# Patient Record
Sex: FEMALE | Race: BLACK OR AFRICAN AMERICAN | NOT HISPANIC OR LATINO | URBAN - METROPOLITAN AREA
[De-identification: names, ages, dates, MRNs, and addresses within clinical notes are randomized per-mention and may not be internally consistent; named-entity substitution may affect disease eponyms.]

---

## 2019-05-30 ENCOUNTER — EMERGENCY (EMERGENCY)
Facility: HOSPITAL | Age: 39
LOS: 1 days | Discharge: HOME | End: 2019-05-30
Admitting: EMERGENCY MEDICINE
Payer: MEDICAID

## 2019-05-30 VITALS
TEMPERATURE: 98 F | DIASTOLIC BLOOD PRESSURE: 109 MMHG | RESPIRATION RATE: 18 BRPM | SYSTOLIC BLOOD PRESSURE: 145 MMHG | OXYGEN SATURATION: 99 % | HEART RATE: 83 BPM

## 2019-05-30 VITALS
OXYGEN SATURATION: 100 % | DIASTOLIC BLOOD PRESSURE: 97 MMHG | SYSTOLIC BLOOD PRESSURE: 148 MMHG | HEART RATE: 64 BPM | RESPIRATION RATE: 18 BRPM

## 2019-05-30 PROCEDURE — 99283 EMERGENCY DEPT VISIT LOW MDM: CPT

## 2019-05-30 PROCEDURE — 73630 X-RAY EXAM OF FOOT: CPT | Mod: 26,50

## 2019-05-30 RX ORDER — ACETAMINOPHEN 500 MG
650 TABLET ORAL ONCE
Refills: 0 | Status: COMPLETED | OUTPATIENT
Start: 2019-05-30 | End: 2019-05-30

## 2019-05-30 RX ORDER — IBUPROFEN 200 MG
600 TABLET ORAL ONCE
Refills: 0 | Status: COMPLETED | OUTPATIENT
Start: 2019-05-30 | End: 2019-05-30

## 2019-05-30 RX ADMIN — Medication 600 MILLIGRAM(S): at 13:32

## 2019-05-30 RX ADMIN — Medication 650 MILLIGRAM(S): at 13:32

## 2019-05-30 NOTE — ED PROVIDER NOTE - OBJECTIVE STATEMENT
38 y/o female with PMHx  presents to the ED c/o 40 y/o female with PMHx  presents to the ED c/o pressure pain on her upper feet BL that she feels when walking since work injury 8 days ago. 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 Pt went to PMD 9 days ago where she was told to go to the ER since it is a work injury. Pt denotes blisters that have resolve and her L fourth toenail is "about to fall off." Pt has taken Motrin yesterday. No other acute complaints at time of eval. 38 y/o female with PMHx  presents to the ED c/o pressure pain on her upper feet BL that she feels when walking since work injury 8 days ago. 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 Pt went to PMD 9 days ago where she was told to go to the ER since it is a work injury. Pt denotes blisters that have resolve and her L fourth toenail is "about to fall off." Pt has taken Motrin yesterday. No other acute complaints at time of eval.

## 2019-05-30 NOTE — ED PROVIDER NOTE - CLINICAL SUMMARY MEDICAL DECISION MAKING FREE TEXT BOX
38 y/o female with PMHx  presents to the ED c/o pressure pain on her upper feet BL that she feels when walking since work injury 8 days ago.

## 2019-05-30 NOTE — ED ADULT TRIAGE NOTE - CHIEF COMPLAINT QUOTE
Patient reports work incident, where boiling water was splashed onto her feet on Thursday 05/23/2019. Patient currently has no open skin, no blister seen on assessment. Patient reports blister healed and now has bilateral residual foot pain.

## 2019-05-30 NOTE — ED PROVIDER NOTE - SKIN, MLM
Skin normal color for race, warm, dry and intact. No evidence of rash. Skin normal color for race, warm, dry and intact. No evidence of rash. No blisters/crepitous/redness/edema appreciated.

## 2021-10-03 ENCOUNTER — EMERGENCY (EMERGENCY)
Facility: HOSPITAL | Age: 41
LOS: 1 days | Discharge: HOME | End: 2021-10-03
Attending: EMERGENCY MEDICINE
Payer: MEDICAID

## 2021-10-03 VITALS
RESPIRATION RATE: 22 BRPM | SYSTOLIC BLOOD PRESSURE: 178 MMHG | TEMPERATURE: 98 F | HEART RATE: 88 BPM | HEIGHT: 69 IN | OXYGEN SATURATION: 99 % | WEIGHT: 220.02 LBS | DIASTOLIC BLOOD PRESSURE: 137 MMHG

## 2021-10-03 VITALS
RESPIRATION RATE: 18 BRPM | SYSTOLIC BLOOD PRESSURE: 160 MMHG | DIASTOLIC BLOOD PRESSURE: 100 MMHG | HEART RATE: 74 BPM | OXYGEN SATURATION: 98 %

## 2021-10-03 LAB
ALBUMIN SERPL ELPH-MCNC: 4.5 G/DL — SIGNIFICANT CHANGE UP (ref 3.3–5)
ALP SERPL-CCNC: 92 U/L — SIGNIFICANT CHANGE UP (ref 40–120)
ALT FLD-CCNC: 7 U/L — LOW (ref 10–45)
ANION GAP SERPL CALC-SCNC: 13 MMOL/L — SIGNIFICANT CHANGE UP (ref 5–17)
APTT BLD: 28.3 SEC — SIGNIFICANT CHANGE UP (ref 27.5–35.5)
AST SERPL-CCNC: 10 U/L — SIGNIFICANT CHANGE UP (ref 10–40)
BASE EXCESS BLDV CALC-SCNC: 1.8 MMOL/L — SIGNIFICANT CHANGE UP (ref -2–2)
BASOPHILS # BLD AUTO: 0.04 K/UL — SIGNIFICANT CHANGE UP (ref 0–0.2)
BASOPHILS NFR BLD AUTO: 0.5 % — SIGNIFICANT CHANGE UP (ref 0–2)
BILIRUB SERPL-MCNC: 0.2 MG/DL — SIGNIFICANT CHANGE UP (ref 0.2–1.2)
BUN SERPL-MCNC: 9 MG/DL — SIGNIFICANT CHANGE UP (ref 7–23)
CA-I SERPL-SCNC: 1.19 MMOL/L — SIGNIFICANT CHANGE UP (ref 1.15–1.33)
CALCIUM SERPL-MCNC: 9.2 MG/DL — SIGNIFICANT CHANGE UP (ref 8.4–10.5)
CHLORIDE BLDV-SCNC: 103 MMOL/L — SIGNIFICANT CHANGE UP (ref 96–108)
CHLORIDE SERPL-SCNC: 102 MMOL/L — SIGNIFICANT CHANGE UP (ref 96–108)
CO2 BLDV-SCNC: 29 MMOL/L — HIGH (ref 22–26)
CO2 SERPL-SCNC: 24 MMOL/L — SIGNIFICANT CHANGE UP (ref 22–31)
CREAT SERPL-MCNC: 0.82 MG/DL — SIGNIFICANT CHANGE UP (ref 0.5–1.3)
EOSINOPHIL # BLD AUTO: 0.17 K/UL — SIGNIFICANT CHANGE UP (ref 0–0.5)
EOSINOPHIL NFR BLD AUTO: 2.2 % — SIGNIFICANT CHANGE UP (ref 0–6)
GAS PNL BLDV: 139 MMOL/L — SIGNIFICANT CHANGE UP (ref 136–145)
GAS PNL BLDV: SIGNIFICANT CHANGE UP
GLUCOSE BLDV-MCNC: 88 MG/DL — SIGNIFICANT CHANGE UP (ref 70–99)
GLUCOSE SERPL-MCNC: 88 MG/DL — SIGNIFICANT CHANGE UP (ref 70–99)
HCG SERPL-ACNC: <2 MIU/ML — SIGNIFICANT CHANGE UP
HCO3 BLDV-SCNC: 27 MMOL/L — SIGNIFICANT CHANGE UP (ref 22–29)
HCT VFR BLD CALC: 35.9 % — SIGNIFICANT CHANGE UP (ref 34.5–45)
HCT VFR BLDA CALC: 37 % — SIGNIFICANT CHANGE UP (ref 34.5–46.5)
HGB BLD CALC-MCNC: 12.2 G/DL — SIGNIFICANT CHANGE UP (ref 11.7–16.1)
HGB BLD-MCNC: 11.3 G/DL — LOW (ref 11.5–15.5)
IMM GRANULOCYTES NFR BLD AUTO: 0.3 % — SIGNIFICANT CHANGE UP (ref 0–1.5)
INR BLD: 1.02 RATIO — SIGNIFICANT CHANGE UP (ref 0.88–1.16)
LACTATE BLDV-MCNC: 0.8 MMOL/L — SIGNIFICANT CHANGE UP (ref 0.7–2)
LYMPHOCYTES # BLD AUTO: 4.33 K/UL — HIGH (ref 1–3.3)
LYMPHOCYTES # BLD AUTO: 55.4 % — HIGH (ref 13–44)
MAGNESIUM SERPL-MCNC: 1.9 MG/DL — SIGNIFICANT CHANGE UP (ref 1.6–2.6)
MCHC RBC-ENTMCNC: 27.9 PG — SIGNIFICANT CHANGE UP (ref 27–34)
MCHC RBC-ENTMCNC: 31.5 GM/DL — LOW (ref 32–36)
MCV RBC AUTO: 88.6 FL — SIGNIFICANT CHANGE UP (ref 80–100)
MONOCYTES # BLD AUTO: 0.56 K/UL — SIGNIFICANT CHANGE UP (ref 0–0.9)
MONOCYTES NFR BLD AUTO: 7.2 % — SIGNIFICANT CHANGE UP (ref 2–14)
NEUTROPHILS # BLD AUTO: 2.7 K/UL — SIGNIFICANT CHANGE UP (ref 1.8–7.4)
NEUTROPHILS NFR BLD AUTO: 34.4 % — LOW (ref 43–77)
NRBC # BLD: 0 /100 WBCS — SIGNIFICANT CHANGE UP (ref 0–0)
NT-PROBNP SERPL-SCNC: 253 PG/ML — SIGNIFICANT CHANGE UP (ref 0–300)
PCO2 BLDV: 45 MMHG — HIGH (ref 39–42)
PH BLDV: 7.39 — SIGNIFICANT CHANGE UP (ref 7.32–7.43)
PLATELET # BLD AUTO: 281 K/UL — SIGNIFICANT CHANGE UP (ref 150–400)
PO2 BLDV: 37 MMHG — SIGNIFICANT CHANGE UP (ref 25–45)
POTASSIUM BLDV-SCNC: 2.9 MMOL/L — CRITICAL LOW (ref 3.5–5.1)
POTASSIUM SERPL-MCNC: 3 MMOL/L — LOW (ref 3.5–5.3)
POTASSIUM SERPL-SCNC: 3 MMOL/L — LOW (ref 3.5–5.3)
PROT SERPL-MCNC: 7.5 G/DL — SIGNIFICANT CHANGE UP (ref 6–8.3)
PROTHROM AB SERPL-ACNC: 12.2 SEC — SIGNIFICANT CHANGE UP (ref 10.6–13.6)
RBC # BLD: 4.05 M/UL — SIGNIFICANT CHANGE UP (ref 3.8–5.2)
RBC # FLD: 14.6 % — HIGH (ref 10.3–14.5)
SAO2 % BLDV: 63.5 % — LOW (ref 67–88)
SARS-COV-2 RNA SPEC QL NAA+PROBE: SIGNIFICANT CHANGE UP
SODIUM SERPL-SCNC: 139 MMOL/L — SIGNIFICANT CHANGE UP (ref 135–145)
TROPONIN T, HIGH SENSITIVITY RESULT: 7 NG/L — SIGNIFICANT CHANGE UP (ref 0–51)
TROPONIN T, HIGH SENSITIVITY RESULT: 8 NG/L — SIGNIFICANT CHANGE UP (ref 0–51)
WBC # BLD: 7.82 K/UL — SIGNIFICANT CHANGE UP (ref 3.8–10.5)
WBC # FLD AUTO: 7.82 K/UL — SIGNIFICANT CHANGE UP (ref 3.8–10.5)

## 2021-10-03 PROCEDURE — 74174 CTA ABD&PLVS W/CONTRAST: CPT | Mod: MA

## 2021-10-03 PROCEDURE — 84132 ASSAY OF SERUM POTASSIUM: CPT

## 2021-10-03 PROCEDURE — 71275 CT ANGIOGRAPHY CHEST: CPT | Mod: 26,MA

## 2021-10-03 PROCEDURE — 99291 CRITICAL CARE FIRST HOUR: CPT

## 2021-10-03 PROCEDURE — 82803 BLOOD GASES ANY COMBINATION: CPT

## 2021-10-03 PROCEDURE — 82947 ASSAY GLUCOSE BLOOD QUANT: CPT

## 2021-10-03 PROCEDURE — 93010 ELECTROCARDIOGRAM REPORT: CPT

## 2021-10-03 PROCEDURE — 74174 CTA ABD&PLVS W/CONTRAST: CPT | Mod: 26,MA

## 2021-10-03 PROCEDURE — 83605 ASSAY OF LACTIC ACID: CPT

## 2021-10-03 PROCEDURE — 82435 ASSAY OF BLOOD CHLORIDE: CPT

## 2021-10-03 PROCEDURE — 82330 ASSAY OF CALCIUM: CPT

## 2021-10-03 PROCEDURE — 71275 CT ANGIOGRAPHY CHEST: CPT | Mod: MA

## 2021-10-03 PROCEDURE — 84295 ASSAY OF SERUM SODIUM: CPT

## 2021-10-03 PROCEDURE — 36415 COLL VENOUS BLD VENIPUNCTURE: CPT

## 2021-10-03 PROCEDURE — 85014 HEMATOCRIT: CPT

## 2021-10-03 PROCEDURE — 85018 HEMOGLOBIN: CPT

## 2021-10-03 PROCEDURE — 85025 COMPLETE CBC W/AUTO DIFF WBC: CPT

## 2021-10-03 PROCEDURE — 99291 CRITICAL CARE FIRST HOUR: CPT | Mod: 25

## 2021-10-03 PROCEDURE — 83880 ASSAY OF NATRIURETIC PEPTIDE: CPT

## 2021-10-03 PROCEDURE — 83735 ASSAY OF MAGNESIUM: CPT

## 2021-10-03 PROCEDURE — 93005 ELECTROCARDIOGRAM TRACING: CPT

## 2021-10-03 PROCEDURE — 84484 ASSAY OF TROPONIN QUANT: CPT

## 2021-10-03 PROCEDURE — 85610 PROTHROMBIN TIME: CPT

## 2021-10-03 PROCEDURE — 71045 X-RAY EXAM CHEST 1 VIEW: CPT | Mod: 26

## 2021-10-03 PROCEDURE — 71045 X-RAY EXAM CHEST 1 VIEW: CPT

## 2021-10-03 PROCEDURE — 96374 THER/PROPH/DIAG INJ IV PUSH: CPT | Mod: XU

## 2021-10-03 PROCEDURE — U0003: CPT

## 2021-10-03 PROCEDURE — 80053 COMPREHEN METABOLIC PANEL: CPT

## 2021-10-03 PROCEDURE — U0005: CPT

## 2021-10-03 PROCEDURE — 84702 CHORIONIC GONADOTROPIN TEST: CPT

## 2021-10-03 PROCEDURE — 85730 THROMBOPLASTIN TIME PARTIAL: CPT

## 2021-10-03 RX ORDER — MORPHINE SULFATE 50 MG/1
4 CAPSULE, EXTENDED RELEASE ORAL ONCE
Refills: 0 | Status: DISCONTINUED | OUTPATIENT
Start: 2021-10-03 | End: 2021-10-03

## 2021-10-03 RX ORDER — LABETALOL HCL 100 MG
1 TABLET ORAL
Qty: 60 | Refills: 0
Start: 2021-10-03 | End: 2021-11-01

## 2021-10-03 RX ORDER — POTASSIUM CHLORIDE 20 MEQ
40 PACKET (EA) ORAL ONCE
Refills: 0 | Status: COMPLETED | OUTPATIENT
Start: 2021-10-03 | End: 2021-10-03

## 2021-10-03 RX ADMIN — MORPHINE SULFATE 4 MILLIGRAM(S): 50 CAPSULE, EXTENDED RELEASE ORAL at 15:02

## 2021-10-03 RX ADMIN — Medication 40 MILLIEQUIVALENT(S): at 16:58

## 2021-10-03 NOTE — ED ADULT NURSE NOTE - CHIEF COMPLAINT QUOTE
worsening sharp midsternal chest pain radiating across to L side and through to back since yesterday, worsened by exertion. also intermittent SOB  hx HTN

## 2021-10-03 NOTE — ED PROVIDER NOTE - CLINICAL SUMMARY MEDICAL DECISION MAKING FREE TEXT BOX
42y/o F w/ h/o HTN (no meds) p/w 1d chest pain radiates towards back, hypertensive but well appearing. DDx ACS, dissection, less likely PE. plan CTA, cardiac labs, EKG, CXR and pain control morphine.

## 2021-10-03 NOTE — ED ADULT NURSE NOTE - OBJECTIVE STATEMENT
41 y.o. female coming in from home for chest pain that started this morning. pt states that she has been feeling chest discomfort x 1 month since her son passed away but today she had increased chest pain. pt states that the chest pain is localized to the left side with no radiation but endorses having SOB that is worsened with lying flat and dizziness with standing. PMH of HTN that pt states she does not take medications for. A&Ox3, hypertensive (216/147), lung sounds clear bilaterally, no abdominal tenderness, bowel sounds present, no lower extremity edema. bed in lowest position, call bell in reach, boyfriend at bedside

## 2021-10-03 NOTE — ED PROVIDER NOTE - NS ED ROS FT
GENERAL: No fever, no chills  EYES: no change in vision  HEENT: no trouble swallowing, no trouble speaking  CARDIAC: +chest pain, no palpitations  PULMONARY: no cough, +SOB  GI: no abdominal pain, no nausea, no vomiting, no diarrhea, no constipation  : no dysuria, no frequency, no change in appearance, no odor of urine  SKIN: no rashes  NEURO: no headache, no weakness  MSK: no joint pain

## 2021-10-03 NOTE — ED PROVIDER NOTE - ATTENDING CONTRIBUTION TO CARE
41yr F hx of htn not on meds currently, marijuana smoker daily p/w chest pain since yesterday, 2 episodes yesterday and one PTA. reports sharp pain, constant no alleviating or exacerbating findings, has SOB with It but no DANIEL, no pleuritic nature of pain. never had this pain before. feels dizzy, pain 8/10. denies cough, sorethroat, recent infectious sx. denies abd pain, nausea vomiting. has peripheral edema  exam notable for well appearing, in mild pain. neuro intact grossly, bilat pulses radial equal, S1-2, clear lungs, Soft abd, some reproducibility of chest pain in palpation. soft non tender abd. pitting edema symmteric. in lower. well perfused.  concern for ACS vs hypertensive urgency, vs dissection. low concern for PE.   discussed with patient and planned for emergent CTA of chest to rule out dissection. medicate for pain. will reeval after imaging. if pain resolved and ACS work up unremarkable. will dc w follow up with primary.

## 2021-10-03 NOTE — ED ADULT TRIAGE NOTE - CHIEF COMPLAINT QUOTE
worsening sharp midsternal chest pain radiating across to L side since yesterday and through to back, worsened by exertion. also intermittent SOB  hx HTN worsening sharp midsternal chest pain radiating across to L side and through to back since yesterday, worsened by exertion. also intermittent SOB  hx HTN

## 2021-10-03 NOTE — ED PROVIDER NOTE - PROGRESS NOTE DETAILS
Rafi, PGY3: pt has no active chest pain. reviewed CT and multiple incidental findings. discussed pt option to start work up here but requesting to go home. VSS. Time was taken to answer all of patients questions and concerns. Return precaution instructions were given and patient understands and feels comfortable with disposition.

## 2021-10-03 NOTE — ED PROVIDER NOTE - OBJECTIVE STATEMENT
40y/o F w/ h/o HTN (no meds) p/w 1 mo. chest discomfort after son , but last 1d has been having intermittent chest pain lasting longer which radiates towards back, never had this before. Endorses SOB, worse when laying flat. Denies fevers, chills, nausea, vomiting, palpitations, cough, abdominal pain, back pain, dysuria, numbness, tingling, recent surgeries, travel history, calf pain, FHx heart disease.

## 2021-10-03 NOTE — ED PROVIDER NOTE - PATIENT PORTAL LINK FT
You can access the FollowMyHealth Patient Portal offered by [facility] by registering at the following website: [email or web address]. By joining [facility]’s FollowMyHealth portal, you will also be able to view your health information using other applications (apps) compatible with our system.

## 2021-10-03 NOTE — ED PROVIDER NOTE - NSFOLLOWUPCLINICS_GEN_ALL_ED_FT
[facility] Gynecology and Obstetrics  Gynceology/OB  [address] [place] [address]  Phone: [phone]  Fax:   Follow Up Time: Routine     [facility] Gynecology and Obstetrics  Gynceology/OB  [address] [place] [address]  Phone: [phone]  Fax:   Follow Up Time: Routine    [facility] [place]  Internal Medicine  [address] [place] [address]  Phone: [phone]  Fax: [phone]  Follow Up Time: Routine

## 2021-10-03 NOTE — ED PROVIDER NOTE - NSFOLLOWUPINSTRUCTIONS_ED_ALL_ED_FT
Chest Pain    Chest pain can be caused by many different conditions which may or may not be dangerous. Causes include heartburn, lung infections, heart attack, blood clot in lungs, skin infections, strain or damage to muscle, cartilage, or bones, etc. In addition to a history and physical examination, an electrocardiogram (ECG) or other lab tests may have been performed to determine the cause of your chest pain. Follow up with your primary care provider or with a cardiologist as instructed.     SEEK IMMEDIATE MEDICAL CARE IF YOU HAVE ANY OF THE FOLLOWING SYMPTOMS: worsening chest pain, coughing up blood, unexplained back/neck/jaw pain, severe abdominal pain, dizziness or lightheadedness, fainting, shortness of breath, sweaty or clammy skin, vomiting, or racing heart beat. These symptoms may represent a serious problem that is an emergency. Do not wait to see if the symptoms will go away. Get medical help right away. Call 911 and do not drive yourself to the hospital.     -Please follow up with your Primary Care Doctor within 24-48 hours and bring your paperwork     Please follow up on incidental findings:  -Multiple tiny subpleural nodules: CT follow-up may be obtained in approximately 1 month to evaluate for resolution.  -Recommend nonemergent pelvic sonography for 2 cm hypodensity right ovary.  -Prominent thyroid gland: obtain thyroid function tests

## 2021-10-03 NOTE — ED ADULT NURSE NOTE - HISTORY OF COVID-19 VACCINATION
----- Message from Indira Gayle sent at 11/28/2018 12:13 PM CST -----  Contact: Self  Pt is calling because she says she was referred by her PCP (even though there is not a referral in Epic) and would like to speak with Staff regarding being scheduled in Interventional Pain Management.      She can be reached at [phone].    Thank you.   No

## 2021-10-04 PROBLEM — I10 ESSENTIAL (PRIMARY) HYPERTENSION: Chronic | Status: ACTIVE | Noted: 2019-05-30

## 2022-07-04 ENCOUNTER — EMERGENCY (EMERGENCY)
Facility: HOSPITAL | Age: 42
LOS: 1 days | Discharge: HOME | End: 2022-07-04
Attending: EMERGENCY MEDICINE | Admitting: EMERGENCY MEDICINE

## 2022-07-04 VITALS
RESPIRATION RATE: 18 BRPM | HEIGHT: 69 IN | SYSTOLIC BLOOD PRESSURE: 140 MMHG | OXYGEN SATURATION: 99 % | TEMPERATURE: 99 F | HEART RATE: 98 BPM | DIASTOLIC BLOOD PRESSURE: 89 MMHG

## 2022-07-04 VITALS
RESPIRATION RATE: 20 BRPM | DIASTOLIC BLOOD PRESSURE: 122 MMHG | HEART RATE: 83 BPM | TEMPERATURE: 98 F | OXYGEN SATURATION: 100 % | SYSTOLIC BLOOD PRESSURE: 171 MMHG

## 2022-07-04 LAB — SARS-COV-2 RNA SPEC QL NAA+PROBE: DETECTED

## 2022-07-04 PROCEDURE — 99284 EMERGENCY DEPT VISIT MOD MDM: CPT

## 2022-07-04 PROCEDURE — 71045 X-RAY EXAM CHEST 1 VIEW: CPT | Mod: 26

## 2022-07-04 RX ORDER — ACETAMINOPHEN 500 MG
975 TABLET ORAL ONCE
Refills: 0 | Status: COMPLETED | OUTPATIENT
Start: 2022-07-04 | End: 2022-07-04

## 2022-07-04 RX ORDER — GUAIFENESIN/DEXTROMETHORPHAN 600MG-30MG
5 TABLET, EXTENDED RELEASE 12 HR ORAL ONCE
Refills: 0 | Status: COMPLETED | OUTPATIENT
Start: 2022-07-04 | End: 2022-07-04

## 2022-07-04 RX ADMIN — Medication 5 MILLILITER(S): at 12:04

## 2022-07-04 RX ADMIN — Medication 975 MILLIGRAM(S): at 12:03

## 2022-07-04 NOTE — ED PROVIDER NOTE - HIV OFFER
Will discuss following results with patient:    Echo 3/22/2019  Left ventricular hypertrophy. Normal LV wall motion and LV systolic function, LVEF 61%  Grade II/IV diastolic dysfunction, moderately elevated filling pressures.  Normal right ventricular size and systolic function. Mild pulmonary hypertension, RVSP 39.5 mmHg.  No significant valvular abnormalities  Mildly dilated sinus of Valsalva = 4.0 cm  Small pericardial effusion.  No significant change since the prior study.   Previously Declined (within the last year)

## 2022-07-04 NOTE — ED PROVIDER NOTE - PATIENT PORTAL LINK FT
You can access the FollowMyHealth Patient Portal offered by [facility] by registering at the following website: [email or web address]. By joining [facility]’s FollowMyHealth portal, you will also be able to view your health information using other applications (apps) compatible with our system.

## 2022-07-04 NOTE — ED PROVIDER NOTE - NSFOLLOWUPINSTRUCTIONS_ED_ALL_ED_FT
Upper Respiratory Infection, Adult  An upper respiratory infection (URI) is a common viral infection of the nose, throat, and upper air passages that lead to the lungs. The most common type of URI is the common cold. URIs usually get better on their own, without medical treatment.    What are the causes?  A URI is caused by a virus. You may catch a virus by: Breathing in droplets from an infected person's cough or sneeze. Touching something that has been exposed to the virus (contaminated) and then touching your mouth, nose, or eyes.    What increases the risk? You are more likely to get a URI if:  You are very young or very old.It is abram or winter. You have close contact with others, such as at a , school, or health care facility. You smoke. You have long-term (chronic) heart or lung disease. You have a weakened disease-fighting (immune) system. You have nasal allergies or asthma. You are experiencing a lot of stress. You work in an area that has poor air circulation. You have poor nutrition.    What are the signs or symptoms? A URI usually involves some of the following symptoms:  Runny or stuffy (congested) nose. Sneezing. Cough. Sore throat. Headache. Fatigue. Fever. Loss of appetite. Pain in your forehead, behind your eyes, and over your cheekbones (sinus pain). Muscle aches. Redness or irritation of the eyes. Pressure in the ears or face.    How is this diagnosed?  This condition may be diagnosed based on your medical history and symptoms, and a physical exam. Your health care provider may use a cotton swab to take a mucus sample from your nose (nasal swab). This sample can be tested to determine what virus is causing the illness.    How is this treated?  URIs usually get better on their own within 7–10 days. You can take steps at home to relieve your symptoms. Medicines cannot cure URIs, but your health care provider may recommend certain medicines to help relieve symptoms, such as: Over-the-counter cold medicines. Cough suppressants. Coughing is a type of defense against infection that helps to clear the respiratory system, so take these medicines only as recommended by your health care provider. Fever-reducing medicines.    Follow these instructions at home:  Activity: Rest as needed. If you have a fever, stay home from work or school until your fever is gone or until your health care provider says you are no longer contagious. Your health care provider may have you wear a face mask to prevent your infection from spreading.  Relieving symptoms   Gargle with a salt-water mixture 3–4 times a day or as needed. To make a salt-water mixture, completely dissolve ½–1 tsp of salt in 1 cup of warm water. Use a cool-mist humidifier to add moisture to the air. This can help you breathe more easily.  Eating and drinking   Drink enough fluid to keep your urine pale yellow. Eat soups and other clear broths.    General instructions:  Take over-the-counter and prescription medicines only as told by your health care provider. These include cold medicines, fever reducers, and cough suppressants. Do not use any products that contain nicotine or tobacco, such as cigarettes and e-cigarettes. If you need help quitting, ask your health care provider. Stay away from secondhand smoke. Stay up to date on all immunizations, including the yearly (annual) flu vaccine. Keep all follow-up visits as told by your health care provider. This is important.  How to prevent the spread of infection to others:  URIs can be passed from person to person (are contagious). To prevent the infection from spreading:  Wash your hands often with soap and water. If soap and water are not available, use hand . Avoid touching your mouth, face, eyes, or nose. Cough or sneeze into a tissue or your sleeve or elbow instead of into your hand or into the air.    Contact a health care provider if:  You are getting worse instead of better. You have a fever or chills. Your mucus is brown or red. You have yellow or brown discharge coming from your nose. You have pain in your face, especially when you bend forward. You have swollen neck glands. You have pain while swallowing. You have white areas in the back of your throat.  Get help right away if:  You have shortness of breath that gets worse. You have severe or persistent:  Headache. Ear pain. Sinus pain. Chest pain. You have chronic lung disease along with any of the following:  Wheezing. Prolonged cough. Coughing up blood. A change in your usual mucus. You have a stiff neck. You have changes in your: Vision. Hearing. Thinking. Mood.     Summary  An upper respiratory infection (URI) is a common infection of the nose, throat, and upper air passages that lead to the lungs. A URI is caused by a virus. URIs usually get better on their own within 7–10 days. Medicines cannot cure URIs, but your health care provider may recommend certain medicines to help relieve symptoms. This information is not intended to replace advice given to you by your health care provider. Make sure you discuss any questions you have with your health care provider. Diagnosis:  Covid-19 +   Upper Respiratory Infection, Adult    An upper respiratory infection (URI) is a common viral infection of the nose, throat, and upper air passages that lead to the lungs. The most common type of URI is the common cold. URIs usually get better on their own, without medical treatment.    What are the causes?  A URI is caused by a virus. You may catch a virus by: Breathing in droplets from an infected person's cough or sneeze. Touching something that has been exposed to the virus (contaminated) and then touching your mouth, nose, or eyes.    What increases the risk? You are more likely to get a URI if:  You are very young or very old. It is abram or winter. You have close contact with others, such as at a , school, or health care facility. You smoke. You have long-term (chronic) heart or lung disease. You have a weakened disease-fighting (immune) system. You have nasal allergies or asthma. You are experiencing a lot of stress. You work in an area that has poor air circulation. You have poor nutrition.    What are the signs or symptoms? A URI usually involves some of the following symptoms:  Runny or stuffy (congested) nose. Sneezing. Cough. Sore throat. Headache. Fatigue. Fever. Loss of appetite. Pain in your forehead, behind your eyes, and over your cheekbones (sinus pain). Muscle aches. Redness or irritation of the eyes. Pressure in the ears or face.    How is this diagnosed?  This condition may be diagnosed based on your medical history and symptoms, and a physical exam. Your health care provider may use a cotton swab to take a mucus sample from your nose (nasal swab). This sample can be tested to determine what virus is causing the illness.    How is this treated?  URIs usually get better on their own within 7–10 days. You can take steps at home to relieve your symptoms. Medicines cannot cure URIs, but your health care provider may recommend certain medicines to help relieve symptoms, such as: Over-the-counter cold medicines. Cough suppressants. Coughing is a type of defense against infection that helps to clear the respiratory system, so take these medicines only as recommended by your health care provider. Fever-reducing medicines.    Follow these instructions at home:  Activity: Rest as needed. If you have a fever, stay home from work or school until your fever is gone or until your health care provider says you are no longer contagious. Your health care provider may have you wear a face mask to prevent your infection from spreading.  Relieving symptoms   Gargle with a salt-water mixture 3–4 times a day or as needed. To make a salt-water mixture, completely dissolve ½–1 tsp of salt in 1 cup of warm water. Use a cool-mist humidifier to add moisture to the air. This can help you breathe more easily.  Eating and drinking   Drink enough fluid to keep your urine pale yellow. Eat soups and other clear broths.    General instructions:  Take over-the-counter and prescription medicines only as told by your health care provider. These include cold medicines, fever reducers, and cough suppressants. Do not use any products that contain nicotine or tobacco, such as cigarettes and e-cigarettes. If you need help quitting, ask your health care provider. Stay away from secondhand smoke. Stay up to date on all immunizations, including the yearly (annual) flu vaccine. Keep all follow-up visits as told by your health care provider. This is important.  How to prevent the spread of infection to others:  URIs can be passed from person to person (are contagious). To prevent the infection from spreading:  Wash your hands often with soap and water. If soap and water are not available, use hand . Avoid touching your mouth, face, eyes, or nose. Cough or sneeze into a tissue or your sleeve or elbow instead of into your hand or into the air.    Contact a health care provider if:  You are getting worse instead of better. You have a fever or chills. Your mucus is brown or red. You have yellow or brown discharge coming from your nose. You have pain in your face, especially when you bend forward. You have swollen neck glands. You have pain while swallowing. You have white areas in the back of your throat.  Get help right away if:  You have shortness of breath that gets worse. You have severe or persistent:  Headache. Ear pain. Sinus pain. Chest pain. You have chronic lung disease along with any of the following:  Wheezing. Prolonged cough. Coughing up blood. A change in your usual mucus. You have a stiff neck. You have changes in your: Vision. Hearing. Thinking. Mood.     Summary  An upper respiratory infection (URI) is a common infection of the nose, throat, and upper air passages that lead to the lungs. A URI is caused by a virus. URIs usually get better on their own within 7–10 days. Medicines cannot cure URIs, but your health care provider may recommend certain medicines to help relieve symptoms. This information is not intended to replace advice given to you by your health care provider. Make sure you discuss any questions you have with your health care provider.

## 2022-07-04 NOTE — ED PROVIDER NOTE - PHYSICAL EXAMINATION
GENERAL: well appearing in no acute distress, non-toxic appearing  HEAD: normocephalic, atraumatic  HEENT: normal conjunctiva, oral mucosa moist, uvula midline, no tonsilar exudates, no JVD  CARDIAC: regular rate and rhythm, normal S1S2, no appreciable murmurs, 2+ pulses in UE/LE b/l  PULM: normal breath sounds, clear to ascultation bilaterally, no rales, rhonchi, wheezing  NEURO: normal speech, normal gait, AAOx3  MSK: ROM intact, no peripheral edema, no calf tenderness b/l  SKIN: well-perfused, extremities warm, no visible rashes  PSYCH: appropriate mood and affect

## 2022-07-04 NOTE — ED PROVIDER NOTE - OBJECTIVE STATEMENT
42F with no relevant PMHx presents with several days of cough, chest congestion, body aches, and chills. Pt with multiple recent covid + exposures. Covid vaccinated x2.  Denies CP, SOB, Abd pain, N/V.

## 2022-07-04 NOTE — ED PROVIDER NOTE - CLINICAL SUMMARY MEDICAL DECISION MAKING FREE TEXT BOX
42F with no relevant PMHx presents with several days of cough, chest congestion, body aches, and chills. Pt with multiple recent covid + exposures. Covid vaccinated x2.  Denies CP, SOB. AVSS, lungs CTA, normal oropharynx. Likely Viral URI, recent covid + exposure, possible PNA, will get swab, CXR, give meds, and reassess.

## 2022-07-04 NOTE — ED ADULT TRIAGE NOTE - CHIEF COMPLAINT QUOTE
p/t c/o of chest congestion, cough, cold and body aches for few days, p/ts co workers tested Covid +, denies sob

## 2022-10-03 NOTE — ED PROVIDER NOTE - DISCHARGE DATE
----- Message from Trisha Meade MD sent at 10/3/2022  8:07 AM CDT -----  Trodelvy   ----- Message -----  From: Nasrin Zuniga RN  Sent: 10/3/2022   8:06 AM CDT  To: Trisha Meade MD    Same chemo?    ----- Message -----  From: Trisha Meade MD  Sent: 10/2/2022   8:09 AM CDT  To: Nasrin Zuniga RN    Add her for chemo next week. MD Tg prior.   ----- Message -----  From: Nasrin Zuniga RN  Sent: 9/30/2022   3:41 PM CDT  To: Trisha Meade MD    Daughter called and states that pt was told yesterday at [facility] that it would be 2 weeks before she started. Daughter states pt's pain is much worse. Questioning if she should wait this long before starting new chemo.             04-Jul-2022

## 2023-02-18 ENCOUNTER — INPATIENT (INPATIENT)
Facility: HOSPITAL | Age: 43
LOS: 0 days | Discharge: LEFT BEFORE DISCHARGE | DRG: 204 | End: 2023-02-18
Attending: INTERNAL MEDICINE | Admitting: HOSPITALIST
Payer: MEDICAID

## 2023-02-18 VITALS
DIASTOLIC BLOOD PRESSURE: 104 MMHG | TEMPERATURE: 99 F | HEART RATE: 86 BPM | SYSTOLIC BLOOD PRESSURE: 131 MMHG | RESPIRATION RATE: 19 BRPM | OXYGEN SATURATION: 99 %

## 2023-02-18 VITALS
WEIGHT: 244.93 LBS | OXYGEN SATURATION: 98 % | DIASTOLIC BLOOD PRESSURE: 140 MMHG | SYSTOLIC BLOOD PRESSURE: 179 MMHG | RESPIRATION RATE: 20 BRPM | HEIGHT: 69 IN | TEMPERATURE: 98 F | HEART RATE: 105 BPM

## 2023-02-18 DIAGNOSIS — R06.01 ORTHOPNEA: ICD-10-CM

## 2023-02-18 LAB
ALBUMIN SERPL ELPH-MCNC: 4 G/DL — SIGNIFICANT CHANGE UP (ref 3.3–5)
ALP SERPL-CCNC: 81 U/L — SIGNIFICANT CHANGE UP (ref 40–120)
ALT FLD-CCNC: 8 U/L — LOW (ref 10–45)
ANION GAP SERPL CALC-SCNC: 14 MMOL/L — SIGNIFICANT CHANGE UP (ref 5–17)
APTT BLD: 26.3 SEC — LOW (ref 27.5–35.5)
AST SERPL-CCNC: 10 U/L — SIGNIFICANT CHANGE UP (ref 10–40)
BASE EXCESS BLDV CALC-SCNC: 1.9 MMOL/L — SIGNIFICANT CHANGE UP (ref -2–3)
BASOPHILS # BLD AUTO: 0.07 K/UL — SIGNIFICANT CHANGE UP (ref 0–0.2)
BASOPHILS NFR BLD AUTO: 0.7 % — SIGNIFICANT CHANGE UP (ref 0–2)
BILIRUB SERPL-MCNC: 0.5 MG/DL — SIGNIFICANT CHANGE UP (ref 0.2–1.2)
BUN SERPL-MCNC: 8 MG/DL — SIGNIFICANT CHANGE UP (ref 7–23)
CA-I SERPL-SCNC: 1.21 MMOL/L — SIGNIFICANT CHANGE UP (ref 1.15–1.33)
CALCIUM SERPL-MCNC: 9 MG/DL — SIGNIFICANT CHANGE UP (ref 8.4–10.5)
CHLORIDE BLDV-SCNC: 105 MMOL/L — SIGNIFICANT CHANGE UP (ref 96–108)
CHLORIDE SERPL-SCNC: 104 MMOL/L — SIGNIFICANT CHANGE UP (ref 96–108)
CK MB CFR SERPL CALC: 1.9 NG/ML — SIGNIFICANT CHANGE UP (ref 0–3.8)
CO2 BLDV-SCNC: 28 MMOL/L — HIGH (ref 22–26)
CO2 SERPL-SCNC: 21 MMOL/L — LOW (ref 22–31)
CREAT SERPL-MCNC: 0.76 MG/DL — SIGNIFICANT CHANGE UP (ref 0.5–1.3)
D DIMER BLD IA.RAPID-MCNC: 234 NG/ML DDU — HIGH
EGFR: 100 ML/MIN/1.73M2 — SIGNIFICANT CHANGE UP
EOSINOPHIL # BLD AUTO: 0.21 K/UL — SIGNIFICANT CHANGE UP (ref 0–0.5)
EOSINOPHIL NFR BLD AUTO: 2 % — SIGNIFICANT CHANGE UP (ref 0–6)
GAS PNL BLDV: 138 MMOL/L — SIGNIFICANT CHANGE UP (ref 136–145)
GAS PNL BLDV: SIGNIFICANT CHANGE UP
GAS PNL BLDV: SIGNIFICANT CHANGE UP
GLUCOSE BLDV-MCNC: 145 MG/DL — HIGH (ref 70–99)
GLUCOSE SERPL-MCNC: 141 MG/DL — HIGH (ref 70–99)
HCG SERPL-ACNC: <2 MIU/ML — SIGNIFICANT CHANGE UP
HCO3 BLDV-SCNC: 27 MMOL/L — SIGNIFICANT CHANGE UP (ref 22–29)
HCT VFR BLD CALC: 32.9 % — LOW (ref 34.5–45)
HCT VFR BLDA CALC: 32 % — LOW (ref 34.5–46.5)
HGB BLD CALC-MCNC: 10.7 G/DL — LOW (ref 11.7–16.1)
HGB BLD-MCNC: 10.3 G/DL — LOW (ref 11.5–15.5)
IMM GRANULOCYTES NFR BLD AUTO: 0.4 % — SIGNIFICANT CHANGE UP (ref 0–0.9)
INR BLD: 0.98 RATIO — SIGNIFICANT CHANGE UP (ref 0.88–1.16)
LACTATE BLDV-MCNC: 0.9 MMOL/L — SIGNIFICANT CHANGE UP (ref 0.5–2)
LYMPHOCYTES # BLD AUTO: 2.21 K/UL — SIGNIFICANT CHANGE UP (ref 1–3.3)
LYMPHOCYTES # BLD AUTO: 21.4 % — SIGNIFICANT CHANGE UP (ref 13–44)
MCHC RBC-ENTMCNC: 27.3 PG — SIGNIFICANT CHANGE UP (ref 27–34)
MCHC RBC-ENTMCNC: 31.3 GM/DL — LOW (ref 32–36)
MCV RBC AUTO: 87.3 FL — SIGNIFICANT CHANGE UP (ref 80–100)
MONOCYTES # BLD AUTO: 0.71 K/UL — SIGNIFICANT CHANGE UP (ref 0–0.9)
MONOCYTES NFR BLD AUTO: 6.9 % — SIGNIFICANT CHANGE UP (ref 2–14)
NEUTROPHILS # BLD AUTO: 7.08 K/UL — SIGNIFICANT CHANGE UP (ref 1.8–7.4)
NEUTROPHILS NFR BLD AUTO: 68.6 % — SIGNIFICANT CHANGE UP (ref 43–77)
NRBC # BLD: 0 /100 WBCS — SIGNIFICANT CHANGE UP (ref 0–0)
NT-PROBNP SERPL-SCNC: 558 PG/ML — HIGH (ref 0–300)
PCO2 BLDV: 41 MMHG — SIGNIFICANT CHANGE UP (ref 39–42)
PH BLDV: 7.42 — SIGNIFICANT CHANGE UP (ref 7.32–7.43)
PLATELET # BLD AUTO: 287 K/UL — SIGNIFICANT CHANGE UP (ref 150–400)
PO2 BLDV: 34 MMHG — SIGNIFICANT CHANGE UP (ref 25–45)
POTASSIUM BLDV-SCNC: 3.1 MMOL/L — LOW (ref 3.5–5.1)
POTASSIUM SERPL-MCNC: 3.1 MMOL/L — LOW (ref 3.5–5.3)
POTASSIUM SERPL-SCNC: 3.1 MMOL/L — LOW (ref 3.5–5.3)
PROT SERPL-MCNC: 7.1 G/DL — SIGNIFICANT CHANGE UP (ref 6–8.3)
PROTHROM AB SERPL-ACNC: 11.4 SEC — SIGNIFICANT CHANGE UP (ref 10.5–13.4)
RAPID RVP RESULT: SIGNIFICANT CHANGE UP
RBC # BLD: 3.77 M/UL — LOW (ref 3.8–5.2)
RBC # FLD: 15.9 % — HIGH (ref 10.3–14.5)
SAO2 % BLDV: 53.4 % — LOW (ref 67–88)
SARS-COV-2 RNA SPEC QL NAA+PROBE: SIGNIFICANT CHANGE UP
SODIUM SERPL-SCNC: 139 MMOL/L — SIGNIFICANT CHANGE UP (ref 135–145)
TROPONIN T, HIGH SENSITIVITY RESULT: 8 NG/L — SIGNIFICANT CHANGE UP (ref 0–51)
TROPONIN T, HIGH SENSITIVITY RESULT: 8 NG/L — SIGNIFICANT CHANGE UP (ref 0–51)
WBC # BLD: 10.32 K/UL — SIGNIFICANT CHANGE UP (ref 3.8–10.5)
WBC # FLD AUTO: 10.32 K/UL — SIGNIFICANT CHANGE UP (ref 3.8–10.5)

## 2023-02-18 PROCEDURE — 70491 CT SOFT TISSUE NECK W/DYE: CPT | Mod: MA

## 2023-02-18 PROCEDURE — 82553 CREATINE MB FRACTION: CPT

## 2023-02-18 PROCEDURE — 71275 CT ANGIOGRAPHY CHEST: CPT | Mod: 26,MA

## 2023-02-18 PROCEDURE — 84702 CHORIONIC GONADOTROPIN TEST: CPT

## 2023-02-18 PROCEDURE — 84295 ASSAY OF SERUM SODIUM: CPT

## 2023-02-18 PROCEDURE — 83605 ASSAY OF LACTIC ACID: CPT

## 2023-02-18 PROCEDURE — 84484 ASSAY OF TROPONIN QUANT: CPT

## 2023-02-18 PROCEDURE — 84132 ASSAY OF SERUM POTASSIUM: CPT

## 2023-02-18 PROCEDURE — 85379 FIBRIN DEGRADATION QUANT: CPT

## 2023-02-18 PROCEDURE — 93308 TTE F-UP OR LMTD: CPT

## 2023-02-18 PROCEDURE — 85025 COMPLETE CBC W/AUTO DIFF WBC: CPT

## 2023-02-18 PROCEDURE — 93308 TTE F-UP OR LMTD: CPT | Mod: 26

## 2023-02-18 PROCEDURE — 71045 X-RAY EXAM CHEST 1 VIEW: CPT

## 2023-02-18 PROCEDURE — 82803 BLOOD GASES ANY COMBINATION: CPT

## 2023-02-18 PROCEDURE — 71045 X-RAY EXAM CHEST 1 VIEW: CPT | Mod: 26

## 2023-02-18 PROCEDURE — 36415 COLL VENOUS BLD VENIPUNCTURE: CPT

## 2023-02-18 PROCEDURE — 80053 COMPREHEN METABOLIC PANEL: CPT

## 2023-02-18 PROCEDURE — 82947 ASSAY GLUCOSE BLOOD QUANT: CPT

## 2023-02-18 PROCEDURE — 70491 CT SOFT TISSUE NECK W/DYE: CPT | Mod: 26,MA

## 2023-02-18 PROCEDURE — 99285 EMERGENCY DEPT VISIT HI MDM: CPT

## 2023-02-18 PROCEDURE — 85014 HEMATOCRIT: CPT

## 2023-02-18 PROCEDURE — 83880 ASSAY OF NATRIURETIC PEPTIDE: CPT

## 2023-02-18 PROCEDURE — 82330 ASSAY OF CALCIUM: CPT

## 2023-02-18 PROCEDURE — 85610 PROTHROMBIN TIME: CPT

## 2023-02-18 PROCEDURE — 85018 HEMOGLOBIN: CPT

## 2023-02-18 PROCEDURE — 85730 THROMBOPLASTIN TIME PARTIAL: CPT

## 2023-02-18 PROCEDURE — 71275 CT ANGIOGRAPHY CHEST: CPT | Mod: MA

## 2023-02-18 PROCEDURE — 96365 THER/PROPH/DIAG IV INF INIT: CPT

## 2023-02-18 PROCEDURE — 0225U NFCT DS DNA&RNA 21 SARSCOV2: CPT

## 2023-02-18 PROCEDURE — 82435 ASSAY OF BLOOD CHLORIDE: CPT

## 2023-02-18 PROCEDURE — G0378: CPT

## 2023-02-18 PROCEDURE — 96366 THER/PROPH/DIAG IV INF ADDON: CPT

## 2023-02-18 RX ORDER — LOSARTAN POTASSIUM 100 MG/1
1 TABLET, FILM COATED ORAL
Qty: 0 | Refills: 0 | DISCHARGE

## 2023-02-18 RX ORDER — ASPIRIN/CALCIUM CARB/MAGNESIUM 324 MG
81 TABLET ORAL DAILY
Refills: 0 | Status: DISCONTINUED | OUTPATIENT
Start: 2023-02-18 | End: 2023-02-18

## 2023-02-18 RX ORDER — FUROSEMIDE 40 MG
20 TABLET ORAL ONCE
Refills: 0 | Status: DISCONTINUED | OUTPATIENT
Start: 2023-02-18 | End: 2023-02-18

## 2023-02-18 RX ORDER — LOSARTAN POTASSIUM 100 MG/1
25 TABLET, FILM COATED ORAL DAILY
Refills: 0 | Status: DISCONTINUED | OUTPATIENT
Start: 2023-02-18 | End: 2023-02-18

## 2023-02-18 RX ORDER — HEPARIN SODIUM 5000 [USP'U]/ML
5000 INJECTION INTRAVENOUS; SUBCUTANEOUS EVERY 12 HOURS
Refills: 0 | Status: DISCONTINUED | OUTPATIENT
Start: 2023-02-18 | End: 2023-02-18

## 2023-02-18 RX ORDER — METOPROLOL TARTRATE 50 MG
25 TABLET ORAL
Refills: 0 | Status: DISCONTINUED | OUTPATIENT
Start: 2023-02-18 | End: 2023-02-18

## 2023-02-18 RX ORDER — ACETAMINOPHEN 500 MG
1000 TABLET ORAL ONCE
Refills: 0 | Status: COMPLETED | OUTPATIENT
Start: 2023-02-18 | End: 2023-02-18

## 2023-02-18 RX ORDER — LOSARTAN POTASSIUM 100 MG/1
25 TABLET, FILM COATED ORAL ONCE
Refills: 0 | Status: COMPLETED | OUTPATIENT
Start: 2023-02-18 | End: 2023-02-18

## 2023-02-18 RX ORDER — PANTOPRAZOLE SODIUM 20 MG/1
40 TABLET, DELAYED RELEASE ORAL
Refills: 0 | Status: DISCONTINUED | OUTPATIENT
Start: 2023-02-18 | End: 2023-02-18

## 2023-02-18 RX ORDER — POTASSIUM CHLORIDE 20 MEQ
40 PACKET (EA) ORAL EVERY 4 HOURS
Refills: 0 | Status: COMPLETED | OUTPATIENT
Start: 2023-02-18 | End: 2023-02-18

## 2023-02-18 RX ADMIN — Medication 400 MILLIGRAM(S): at 05:07

## 2023-02-18 RX ADMIN — Medication 1000 MILLIGRAM(S): at 07:23

## 2023-02-18 RX ADMIN — Medication 40 MILLIEQUIVALENT(S): at 06:34

## 2023-02-18 RX ADMIN — Medication 40 MILLIEQUIVALENT(S): at 09:34

## 2023-02-18 RX ADMIN — LOSARTAN POTASSIUM 25 MILLIGRAM(S): 100 TABLET, FILM COATED ORAL at 07:31

## 2023-02-18 RX ADMIN — Medication 25 MILLIGRAM(S): at 11:22

## 2023-02-18 RX ADMIN — Medication 81 MILLIGRAM(S): at 11:23

## 2023-02-18 NOTE — ED ADULT NURSE REASSESSMENT NOTE - NS ED NURSE REASSESS COMMENT FT1
Received pt at shift change, A & O x 4, in no acute distress. Presented to ED c/o chest tightness, shortness of  breath, and L sided neck swelling. Currently hypertensive, vital signs otherwise stable, ambulatory, states that pain has subsided. Evaluation in progress. On continuous cardiac monitor. Reassessment s ongoing.

## 2023-02-18 NOTE — ED ADULT TRIAGE NOTE - CHIEF COMPLAINT QUOTE
coughing, feels swelling of throat, painful swallow; symptoms began 24 hours ago; difficulty breathing when lying flat

## 2023-02-18 NOTE — DISCHARGE NOTE PROVIDER - HOSPITAL COURSE
42 female with h/o HTN (Losartan only) presents with 1 day of severe chest tightness and dyspnea which was preventing her from sleeping. Examination notable for tachycardia heart rate low 100s, elevated blood pressure 170s to 190s systolic. ECG at 413 interpreted by ED and shows sinus tachycardia rate 103. HPI also notable for intermittent neck swelling L clavicle. Ovarian nodule noted on prior imaging ico fam Hx of Uterine Ca (aunt). Previous imaging with multiple pleural nodules as well. Unclear if pt with rheumatologic dz, neoplasm, viral illness (though pt afebrile, no sick contacts. Trops neg thus far; echo performed in ED - results pending.    Patient requesting to leave Against Medical Advice.  After thorough discussion regarding the risks, the patient verbalized understanding that leaving now, without completing the recommended diagnostic and treatment regime, may result in permanent physical injury up to and including death.  Notified Dr. Gallagher that patient wishes to leave AMA - Dr. Gallagher agrees that patient may leave AMA .

## 2023-02-18 NOTE — H&P ADULT - NSHPLABSRESULTS_GEN_ALL_CORE
10.3   10.32 )-----------( 287      ( 18 Feb 2023 05:17 )             32.9       02-18    139  |  104  |  8   ----------------------------<  141<H>  3.1<L>   |  21<L>  |  0.76    Ca    9.0      18 Feb 2023 05:00    TPro  7.1  /  Alb  4.0  /  TBili  0.5  /  DBili  x   /  AST  10  /  ALT  8<L>  /  AlkPhos  81  02-18                  PT/INR - ( 18 Feb 2023 05:06 )   PT: 11.4 sec;   INR: 0.98 ratio         PTT - ( 18 Feb 2023 05:06 )  PTT:26.3 sec    Lactate Trend      CARDIAC MARKERS ( 18 Feb 2023 05:00 )  x     / x     / x     / x     / 1.9 ng/mL        CAPILLARY BLOOD GLUCOSE

## 2023-02-18 NOTE — ED PROVIDER NOTE - CLINICAL SUMMARY MEDICAL DECISION MAKING FREE TEXT BOX
Attending MD Castro: 42-year-old woman with a history of hypertension on losartan presenting for 24 hours of central chest pain shortness of breath cough throat pain.  Patient states that pain began in the chest.  It does not involve the back.  It is sharp and worse with deep inspiration and lying flat.  She is particular short of breath when she is lying flat.  She denies any calf tenderness or swelling bilaterally.  Subjective fevers present.  Patient is taking Advil for her pain without much improvement.    Examination notable for tachycardia heart rate low 100s, elevated blood pressure 170s to 190s systolic.  Patient is sitting up in the stretcher somewhat uncomfortable appearing but nontoxic.  He appears somewhat short of breath.  Heart sounds regular but tachycardic.  No obvious pericardial friction rub.  Clear lungs posteriorly.  No calf tenderness bilaterally.  Extremities warm and well-perfused.  No obvious peripheral edema.  Moves all extremities spontaneously.  Normal affect.  HEENT exam notable for clear posterior oropharynx.  No trismus.  Mild tenderness of the submental and submandibular area without tongue elevation.  Neck is supple.  Tenderness in the left supraclavicular area with swelling.    ECG at 413 independently interpreted by me and shows sinus tachycardia rate 103.  Left axis deviation.  No diagnostic acute ischemic changes.    Differential diagnosis includes but is not limited to myopericarditis, pulmonary embolism, pneumonia, viral process, less likely ACS.  Less likely acute heart failure.  We will obtain cardiac biomarkers, BNP, chest x-ray, CT neck and CTA chest to further evaluate etiology of possible swelling and fullness with pain in the supraclavicular area. Attending MD Castro: 42-year-old woman with a history of hypertension on losartan presenting for 24 hours of central chest pain shortness of breath cough throat pain.  Patient states that pain began in the chest.  It does not involve the back.  It is sharp and worse with deep inspiration and lying flat.  She is particular short of breath when she is lying flat.  She denies any calf tenderness or swelling bilaterally.  Subjective fevers present.  Patient is taking Advil for her pain without much improvement.    Examination notable for tachycardia heart rate low 100s, elevated blood pressure 170s to 190s systolic.  Patient is sitting up in the stretcher somewhat uncomfortable appearing but nontoxic.  He appears somewhat short of breath.  Heart sounds regular but tachycardic.  No obvious pericardial friction rub.  Clear lungs posteriorly.  No calf tenderness bilaterally.  Extremities warm and well-perfused.  No obvious peripheral edema.  Moves all extremities spontaneously.  Normal affect.  HEENT exam notable for clear posterior oropharynx.  No trismus.  Mild tenderness of the submental and submandibular area without tongue elevation.  Neck is supple.  Tenderness in the left supraclavicular area with swelling.    ECG at 413 independently interpreted by me and shows sinus tachycardia rate 103.  Left axis deviation.  No diagnostic acute ischemic changes.    Differential diagnosis includes but is not limited to myopericarditis, pulmonary embolism, pneumonia, viral process, less likely ACS.  Less likely acute heart failure.  We will obtain cardiac biomarkers, BNP, chest x-ray, CT neck and CTA chest to further evaluate etiology of possible swelling and fullness with pain in the supraclavicular area.    Guy Rucker MD PGY-2  HPI also notable for intermittent neck swelling L clavicle. Ovarian nodule noted on prior imaging ico fam Hx of Uterine Ca (aunt). Previous imaging with multiple pleural nodules as well. Unclear if pt with rheumatologic dz, neoplasm, viral illness (though pt afebrile, no sick contacts).

## 2023-02-18 NOTE — ED PROVIDER NOTE - PROGRESS NOTE DETAILS
Guy Rucker MD PGY-2  CT Chest with pulm edema / GGO's, awaiting final read. BNP elevated - likely artificially low ico elevated BMI suggestive of cardiac stress. Trop negative (not myocarditis); pt w/o relief leaning forward (less likely pericarditis), may not be cardiogenic in origin (rather BNP is myocardial stretching). Bedside POCUS with grossly normal LVSF but should get comprehensive TTE (i.e. TBA). SBP elevated, so may be HTNu - will manage with anti-HTN agents, reassess.    Labs notable for hypokalemia, which was actually noted on prior labs as well. Given severe HTN here, hypokalemia should consider hyperaldosteronism or other secondardy causes of HTN if persistent despite pain / anxiety control.     Pt denies fam hx of rheumatologic dz but does have h/o resistant HTN & uterine Ca. given c/f L supraclavicular node/swelling, metastatic neoplasm cannot be exluded, will f/u CT Neck. Elvin Spear MD (PGY-2): Patient signed out to me by the night team.  She is a 42-year-old female with hypertension, medicated with losartan.  She is  presenting with orthopnea.  She is pending formal reads of her CTA chest, ordered after her dimer came back positive.  No overt evidence of PE on review by the ED team, however she has scattered pulmonary opacities with bilateral pleural effusions.  Her CT neck soft tissue shows cervical lymphadenopathy.  She has marked hypertensive 203/130 while we are in the room.  Her bedside POCUS does not show overt wall motion abnormalities or evidence of cor pulmonale.  No evidence of hypertensive crisis at this time, will treat with home losartan 25.  Will admit for work-up of pulmonary lesions with associated symptomology and marked hypertensive, consider autoimmune process versus malignancy versus other. Elvin Spear MD (PGY-2): Of note, the patient reports that her son was murdered.  She notes that the anniversary of this was just a few days ago.  She notes that she has been under significant amount of stress, especially since the  associated with her son's case recently discovered new evidence which might lead to the apprehension of the killer.  The patient has not sought mental health help since the murder of her son.  Will attempt to organize for social work to set her up with an outpatient provider when she leaves the hospital.

## 2023-02-18 NOTE — ED PROVIDER NOTE - NS ED ROS FT
GEN: No fever, chills, night sweats, weight loss  EYES: No vision changes, irritation, itchiness  ENT: as per HPI  RESP: as per HPI  CARDIOVASCULAR: as per HPI  GI: No nausea/vomiting, diarrhea, constipation  :  No change in urine output; no dysuria, hematuria, or discharge  MSK: + diffuse joint pain  SKIN: No rashes  NEURO: No headache; no abnormal movements; no numbness or tingling  Remainder negative, except as per the HPI

## 2023-02-18 NOTE — ED ADULT NURSE NOTE - OBJECTIVE STATEMENT
Pt is 42Y F, pmhx HTN, c/o chest pressure, SOB, throat pain for 2 days. Pt states she feels chest pressure, heaviness, SOB when lying down, and pain around the throat area. Pt able to swallow, no swelling of throat. Pt denies any fever, chills, NVD, or any other symptoms. Pt placed on cardiac monitor- sinus tachy, pt A&Ox3, ambulatory, updated on plan of care, comfort and safety secured

## 2023-02-18 NOTE — DISCHARGE NOTE PROVIDER - DISCHARGE SERVICE FOR PATIENT
on the discharge service for the patient. I have reviewed and made amendments to the documentation where necessary. Hydroxyzine Counseling: Patient advised that the medication is sedating and not to drive a car after taking this medication.  Patient informed of potential adverse effects including but not limited to dry mouth, urinary retention, and blurry vision.  The patient verbalized understanding of the proper use and possible adverse effects of hydroxyzine.  All of the patient's questions and concerns were addressed.

## 2023-02-18 NOTE — CHART NOTE - NSCHARTNOTEFT_GEN_A_CORE
Patient requesting to leave Against Medical Advice.  After thorough discussion regarding the risks, the patient verbalized understanding that leaving now, without completing the recommended diagnostic and treatment regime, may result in permanent physical injury up to and including death.  Notified Dr. Gallagher that patient wishes to leave AMA - Dr. Gallagher agrees that patient may leave AMA.    T. Guy   [id number]  Dept of MEDICINE

## 2023-02-18 NOTE — ED ADULT NURSE NOTE - TEMPLATE
volume status improving - still vent dependent with congestion/effusions on CXR-- UF again today as tolerates Cardiac

## 2023-02-18 NOTE — ED PROVIDER NOTE - ATTENDING CONTRIBUTION TO CARE
Attending MD Castro:  I personally have seen and examined this patient. I have performed a substantive portion of the visit including all aspects of the medical decision making.  Resident note reviewed and agree on plan of care and except where noted.              *The above represents an initial assessment/impression. Please refer to progress notes for potential changes in patient clinical course*

## 2023-02-18 NOTE — DISCHARGE NOTE PROVIDER - NSDCCPCAREPLAN_GEN_ALL_CORE_FT
PRINCIPAL DISCHARGE DIAGNOSIS  Diagnosis: Orthopnea  Assessment and Plan of Treatment: Patient requesting to leave Against Medical Advice.  After thorough discussion regarding the risks, the patient verbalized understanding that leaving now, without completing the recommended diagnostic and treatment regime, may result in permanent physical injury up to and including death.   Continue to follow a low salt/sodium diet.  Perform physical activities as tolerated in consultation with your Primary Care Provider and physical therapist.  Take all medications as prescribed.  Follow up with your medical doctor for routine blood pressure monitoring at your next visit.  Notify your doctor if you have any of the following symptoms:  Dizziness, lightheadedness, blurry vision, headache, chest pain, or shortness of breath.

## 2023-02-18 NOTE — H&P ADULT - HISTORY OF PRESENT ILLNESS
pt  is a 42F with h/o HTN  presents with 1 day of severe chest tightness and dyspnea which was preventing her from sleeping.   She denies any fever/chills/cough in this context.    	She says that she has been having intermittent neck swelling above the L clavicle and neck as well.     	Her son was murdered 08/2022, which has been causing her a significant amount of stress, particularly recently as her family has also had several major life events ( which she thought might be contributing.     	In 10/2021, she had similar symptoms and had a CT chest performed which showed multiple subpleural lung nodule  . She says that she followed up at that time with her PCP & another doctor who obtained cultures  . She denies ever having had a repeat CT scan.   Pt denies tobacco use. Social EtOH, +THC.

## 2023-02-18 NOTE — H&P ADULT - ASSESSMENT
pt w/ htn / [facility]  r/o acs  cards eval  trops neg thus far  echo  tele  add b blocker  dvt proph  pulm eval

## 2023-02-18 NOTE — ED PROVIDER NOTE - PHYSICAL EXAMINATION
GEN: Awake, AOx3, NAD.  HEENT: NCAT  CARDIO: RRR. Normal S1/S2, no m/r/g. No JVD.  RESP: Tachypneic but speaking in full sentences  ABD: Soft, NTND.   MSK: No obvious deformity or ROM deficit.   SKIN: Warm, dry.   NEURO: Moves all four extremities spontaneously  PSYCH: Appropriate mood & affect.

## 2023-02-18 NOTE — ED PROVIDER NOTE - OBJECTIVE STATEMENT
Ms. Cheek is a 42F with h/o HTN (Losartan only) presents with 1 day of severe chest tightness and dyspnea which was preventing her from sleeping. She denies any fever/chills/cough in this context. She recently travelled to [place] for a graduation but denies sick contacts. She flew back from [place] at that time.     She says that she has been having intermittent neck swelling above the L clavicle and neck as well.     Her son was murdered 08/2022, which has been causing her a significant amount of stress, particularly recently as her family has also had several major life events (granddaughter's birthday, daughter's graduation), which she thought might be contributing.     In 10/2021, she had similar symptoms and had a CT chest performed which showed multiple subpleural lung nodules. She says that she followed up at that time with her PCP & another doctor who obtained cultures. She denies ever having had a repeat CT scan. Pt denies tobacco use. Social EtOH, +THC.